# Patient Record
Sex: MALE | URBAN - METROPOLITAN AREA
[De-identification: names, ages, dates, MRNs, and addresses within clinical notes are randomized per-mention and may not be internally consistent; named-entity substitution may affect disease eponyms.]

---

## 2018-06-07 ENCOUNTER — OFFICE VISIT (OUTPATIENT)
Dept: SURGERY | Facility: CLINIC | Age: 28
End: 2018-06-07
Payer: COMMERCIAL

## 2018-06-07 VITALS
HEIGHT: 75 IN | HEART RATE: 60 BPM | WEIGHT: 194.31 LBS | DIASTOLIC BLOOD PRESSURE: 70 MMHG | SYSTOLIC BLOOD PRESSURE: 122 MMHG | TEMPERATURE: 98 F | BODY MASS INDEX: 24.16 KG/M2

## 2018-06-07 DIAGNOSIS — K21.9 GASTROESOPHAGEAL REFLUX DISEASE WITHOUT ESOPHAGITIS: ICD-10-CM

## 2018-06-07 PROCEDURE — 99203 OFFICE O/P NEW LOW 30 MIN: CPT | Mod: S$GLB,,, | Performed by: SURGERY

## 2018-06-07 PROCEDURE — 99999 PR PBB SHADOW E&M-NEW PATIENT-LVL III: CPT | Mod: PBBFAC,,, | Performed by: SURGERY

## 2018-06-07 RX ORDER — CITALOPRAM 20 MG/1
TABLET, FILM COATED ORAL
Refills: 0 | COMMUNITY
Start: 2018-05-18

## 2018-06-07 RX ORDER — DEXCHLORPHENIRAMINE MALEATE AND PSEUDOEPHEDRINE HYDROCHLORIDE 2; 60 MG/1; MG/1
TABLET, MULTILAYER ORAL
Refills: 0 | COMMUNITY
Start: 2018-03-16

## 2018-06-07 RX ORDER — AMITRIPTYLINE HYDROCHLORIDE 100 MG/1
TABLET ORAL
Refills: 11 | COMMUNITY
Start: 2018-04-14

## 2018-06-07 RX ORDER — OMEPRAZOLE 40 MG/1
40 CAPSULE, DELAYED RELEASE ORAL 2 TIMES DAILY
Refills: 5 | COMMUNITY
Start: 2018-04-15

## 2018-06-07 NOTE — Clinical Note
June 7, 2018      Hernandez Gonzalez MD  [address] [facility] [address]           [facility]  [address]  [place] [address]  Phone: [phone]          Patient: Rajesh Hermosillo   MR Number: [id number]   YOB: 1990   Date of Visit: 6/7/2018       Dear Dr. Hernandez Gonzalez:    Thank you for referring Rajesh Hermosillo to me for evaluation. Attached you will find relevant portions of my assessment and plan of care.    If you have questions, please do not hesitate to call me. I look forward to following Rajesh Hermosillo along with you.    Sincerely,    Qasim Moser MD    Enclosure  CC:  No Recipients    If you would like to receive this communication electronically, please contact [email or web address] or [phone] to request more information on [email or web address] Link access.    For providers and/or their staff who would like to refer a patient to [facility], please contact us through our one-stop-shop provider referral line, [facility] , at [phone].    If you feel you have received this communication in error or would no longer like to receive these types of communications, please e-mail [email or web address]

## 2018-06-07 NOTE — PROGRESS NOTES
History & Physical    SUBJECTIVE:     History of Present Illness:  Patient is a 27 y.o. male presents with gerd.  He has had trouble most of his life but symptoms worsened last year.  He has epigastric pain with typical heartburn, regurgitation, bloating, gas and nausea with eating.  Fried foods, red sauce and beer are the worst foods for him.  There are no alleviating factors.  He takes doubles up the doses of PPI with no improvement.  He saw me 10 years ago (about) and states that a ph probe at that time was positive.    No chief complaint on file.      Review of patient's allergies indicates:   Allergen Reactions    Penicillins Rash       Current Outpatient Prescriptions   Medication Sig Dispense Refill    citalopram (CELEXA) 20 MG tablet TK 1 T PO D  0    omeprazole (PRILOSEC) 40 MG capsule Take 40 mg by mouth 2 (two) times daily.  5    amitriptyline (ELAVIL) 100 MG tablet TAKE 1 TABLET BY MOUTH EVERY NIGHT AS DIRECTED  11    RESCON 2-60 mg Tab TK 1 PO Q 12 H  0     No current facility-administered medications for this visit.        Past Medical History:   Diagnosis Date    Anxiety     GERD (gastroesophageal reflux disease)      History reviewed. No pertinent surgical history.  History reviewed. No pertinent family history.  Social History   Substance Use Topics    Smoking status: Former Smoker    Smokeless tobacco: Former User    Alcohol use Not on file        Review of Systems:  Review of Systems   Constitutional: Negative for fever and unexpected weight change.   Respiratory: Negative for cough, chest tightness and shortness of breath.    Cardiovascular: Negative for chest pain.   Gastrointestinal: Negative for constipation and diarrhea.   Genitourinary: Negative for difficulty urinating and dysuria.   Skin: Negative for rash and wound.   Neurological: Negative for seizures and headaches.   Hematological: Does not bruise/bleed easily.       OBJECTIVE:     Vital Signs (Most Recent)  Temp: 98.4 °F  "(36.9 °C) (06/07/18 0839)  Pulse: 60 (06/07/18 0839)  BP: 122/70 (06/07/18 0839)  6' 3" (1.905 m)  88.2 kg (194 lb 5.4 oz)     Physical Exam:  Physical Exam   Constitutional: He is oriented to person, place, and time. He appears well-developed and well-nourished.   Neck: Normal range of motion. Neck supple.   Cardiovascular: Normal rate, regular rhythm and normal heart sounds.    Pulmonary/Chest: Effort normal and breath sounds normal.   Abdominal: Soft. Bowel sounds are normal. There is no tenderness.   Neurological: He is alert and oriented to person, place, and time.   Skin: Skin is warm and dry.   Psychiatric: He has a normal mood and affect. His behavior is normal. Judgment and thought content normal.   Vitals reviewed.      Laboratory  None available    Diagnostic Results:  US: Reviewed  EGD: Reviewed    ASSESSMENT/PLAN:     Small hiatal hernia with gerd symptoms.  He states his ph probe was positive but we don't have it in our current records.    PLAN:    Obtain motility and ges.  For lap hh repair and fundoplication.               "

## 2018-06-07 NOTE — LETTER
[facility] - [facility]  [address]  [facility] [address]  Phone: [phone] June 7, 2018      Hernandez Gonzalez MD  [address] [facility] [address]    Patient: Rajesh Hermosillo   MR Number: [id number]   YOB: 1990   Date of Visit: 6/7/2018     Dear Dr. Gonzalez:    Thank you for referring Rajesh Hermosillo to me for evaluation. Attached you will find relevant portions of my assessment and plan of care.    ASSESSMENT/PLAN:     Small hiatal hernia with GERD symptoms.  He states his ph probe was positive but we don't have it in our current records.    PLAN: Obtain motility and GES.  For lap HH repair and fundoplication.     If you have questions, please do not hesitate to call me. I look forward to following Rajesh Hermosillo along with you.    Sincerely,      Qasim Moser MD  Section Head - General, Laparoscopic, Bariatric  Acute Care and Oncologic Surgery   - Surgical Weight Loss Program  [facility]    WSR/trent    CC  Rajesh Gama MD    Enclosure

## 2018-06-11 ENCOUNTER — TELEPHONE (OUTPATIENT)
Dept: SURGERY | Facility: CLINIC | Age: 28
End: 2018-06-11

## 2018-06-11 NOTE — TELEPHONE ENCOUNTER
Ishaanalex wasn't available to accept my call personally,but I left a voice message with direct contact asking him to call the office to schedule manometry/motility and gastric emptying study.

## 2018-06-14 ENCOUNTER — TELEPHONE (OUTPATIENT)
Dept: SURGERY | Facility: CLINIC | Age: 28
End: 2018-06-14

## 2018-06-14 NOTE — TELEPHONE ENCOUNTER
Spoke to wife she has the motility study scheduled for 6/20/18 and the GES was scheduled for 6/26/18. Pt v/u

## 2018-06-14 NOTE — TELEPHONE ENCOUNTER
----- Message from Lissette Burks sent at 6/14/2018  9:35 AM CDT -----  Contact: pt surinder- Vinita  Pt asiagali called and stated that pt had missed a call from the nurse on Monday in regards to getting a gastric emptying study scheduled. Pt surinder stated that due to pt's job, he is unavailable to answer the phone most of the time, so it is more efficient to contact her when getting things scheduled. Pt surinder stated that she has attempted several times over the past couple of days to get in contact with the nurse, however she has not been able to reach her. Pt asiagali would like for the nurse to give either pt, or herself a call back to get this procedure scheduled.    If attempting to call pt, please call between 11:30-12:30. Pt can be reached at [phone]  If unable to reach pt, please contact Vinita at [phone]. Vinita can be reached at any time. Thank you.

## 2018-06-20 ENCOUNTER — SURGERY (OUTPATIENT)
Age: 28
End: 2018-06-20

## 2018-06-20 ENCOUNTER — HOSPITAL ENCOUNTER (OUTPATIENT)
Facility: HOSPITAL | Age: 28
Discharge: HOME | End: 2018-06-20
Attending: INTERNAL MEDICINE | Admitting: INTERNAL MEDICINE
Payer: COMMERCIAL

## 2018-06-20 VITALS
DIASTOLIC BLOOD PRESSURE: 76 MMHG | SYSTOLIC BLOOD PRESSURE: 134 MMHG | BODY MASS INDEX: 23.62 KG/M2 | OXYGEN SATURATION: 97 % | WEIGHT: 190 LBS | TEMPERATURE: 99 F | RESPIRATION RATE: 20 BRPM | HEART RATE: 71 BPM | HEIGHT: 75 IN

## 2018-06-20 DIAGNOSIS — K44.9 HIATAL HERNIA: ICD-10-CM

## 2018-06-20 PROCEDURE — 91037 ESOPH IMPED FUNCTION TEST: CPT | Performed by: INTERNAL MEDICINE

## 2018-06-20 PROCEDURE — 25000003 PHARM REV CODE 250: Performed by: INTERNAL MEDICINE

## 2018-06-20 PROCEDURE — 91010 ESOPHAGUS MOTILITY STUDY: CPT | Performed by: INTERNAL MEDICINE

## 2018-06-20 RX ORDER — CHLORDIAZEPOXIDE HYDROCHLORIDE AND CLIDINIUM BROMIDE 5; 2.5 MG/1; MG/1
1 CAPSULE ORAL 3 TIMES DAILY
COMMUNITY

## 2018-06-20 RX ORDER — LIDOCAINE HYDROCHLORIDE 20 MG/ML
JELLY TOPICAL ONCE AS NEEDED
Status: COMPLETED | OUTPATIENT
Start: 2018-06-20 | End: 2018-06-20

## 2018-06-20 RX ADMIN — LIDOCAINE HYDROCHLORIDE 10 ML: 20 JELLY TOPICAL at 10:06

## 2018-06-21 PROCEDURE — 91010 ESOPHAGUS MOTILITY STUDY: CPT | Mod: 26,,, | Performed by: INTERNAL MEDICINE

## 2018-06-21 PROCEDURE — 91037 ESOPH IMPED FUNCTION TEST: CPT | Mod: 26,,, | Performed by: INTERNAL MEDICINE

## 2018-06-23 NOTE — [employer] PATIENT INSTRUCTIONS
Discharge Summary/Instructions after an Endoscopic Procedure  Patient Name: Rajesh Hermosillo  Patient MRN: [id number]  Patient YOB: 1990 Thursday, June 21, 2018  Karson Gallardo MD  RESTRICTIONS:  During your procedure today, you received medications for sedation.  These   medications may affect your judgment, balance and coordination.  Therefore,   for 24 hours, you have the following restrictions:   - DO NOT drive a car, operate machinery, make legal/financial decisions,   sign important papers or drink alcohol.    ACTIVITY:  Today: no heavy lifting, straining or running due to procedural   sedation/anesthesia.  The following day: return to full activity including work.  DIET:  Eat and drink normally unless instructed otherwise.     TREATMENT FOR COMMON SIDE EFFECTS:  - Mild abdominal pain, nausea, belching, bloating or excessive gas:  rest,   eat lightly and use a heating pad.  - Sore Throat: treat with throat lozenges and/or gargle with warm salt   water.  - Because air was used during the procedure, expelling large amounts of air   from your rectum or belching is normal.  - If a bowel prep was taken, you may not have a bowel movement for 1-3 days.    This is normal.  SYMPTOMS TO WATCH FOR AND REPORT TO YOUR PHYSICIAN:  1. Abdominal pain or bloating, other than gas cramps.  2. Chest pain.  3. Back pain.  4. Signs of infection such as: chills or fever occurring within 24 hours   after the procedure.  5. Rectal bleeding, which would show as bright red, maroon, or black stools.   (A tablespoon of blood from the rectum is not serious, especially if   hemorrhoids are present.)  6. Vomiting.  7. Weakness or dizziness.  GO DIRECTLY TO THE NEAREST EMERGENCY ROOM IF YOU HAVE ANY OF THE FOLLOWING:      Difficulty breathing              Chills and/or fever over 101 F   Persistent vomiting and/or vomiting blood   Severe abdominal pain   Severe chest pain   Black, tarry stools   Bleeding- more than one  tablespoon   Any other symptom or condition that you feel may need urgent attention  Your doctor recommends these additional instructions:  If any biopsies were taken, your doctors clinic will contact you in 1 to 2   weeks with any results.  - Patient has a contact number available for emergencies.  The signs and   symptoms of potential delayed complications were discussed with the   patient.  Return to normal activities tomorrow.  Written discharge   instructions were provided to the patient.   - Resume previous diet.   - Follow an antireflux regimen.   - If proceeding with reflux surgery, consider partial wrap  For questions, problems or results please call your physician - Karson Gallardo MD at Work:  [phone].  [facility], EMERGENCY ROOM PHONE NUMBER: [phone]  IF A COMPLICATION OR EMERGENCY SITUATION ARISES AND YOU ARE UNABLE TO REACH   YOUR PHYSICIAN - GO DIRECTLY TO THE EMERGENCY ROOM.  Karson Gallardo MD  6/23/2018 1:24:15 PM  This report has been verified and signed electronically.  [employer]

## 2018-06-26 ENCOUNTER — HOSPITAL ENCOUNTER (OUTPATIENT)
Dept: RADIOLOGY | Facility: HOSPITAL | Age: 28
Discharge: HOME | End: 2018-06-26
Attending: SURGERY
Payer: COMMERCIAL

## 2018-06-26 DIAGNOSIS — K21.9 GASTROESOPHAGEAL REFLUX DISEASE WITHOUT ESOPHAGITIS: ICD-10-CM

## 2018-06-26 PROCEDURE — 78264 GASTRIC EMPTYING IMG STUDY: CPT | Mod: 26,,, | Performed by: RADIOLOGY

## 2018-06-26 PROCEDURE — A9541 TC99M SULFUR COLLOID: HCPCS

## 2018-06-26 PROCEDURE — 78264 GASTRIC EMPTYING IMG STUDY: CPT | Mod: TC

## 2018-07-11 ENCOUNTER — TELEPHONE (OUTPATIENT)
Dept: BARIATRICS | Facility: CLINIC | Age: 28
End: 2018-07-11

## 2018-07-11 NOTE — TELEPHONE ENCOUNTER
----- Message from Kiley Christopher sent at 7/11/2018  3:47 PM CDT -----  Contact: evelin/ wife to be  Caller needs someone in the office to call them back, needs some information, please call Pt. At [phone]

## 2018-08-13 ENCOUNTER — TELEPHONE (OUTPATIENT)
Dept: SURGERY | Facility: CLINIC | Age: 28
End: 2018-08-13

## 2018-08-13 NOTE — TELEPHONE ENCOUNTER
Spoke to patients wife and she is wanting to schedule her husbands ph probe test, I will have the nurse place an order and the endoscopy # was given so it can be scheduled by patient. Pt v/u

## 2018-08-13 NOTE — TELEPHONE ENCOUNTER
----- Message from Kiley Christopher sent at 8/13/2018 10:26 AM CDT -----  Contact: Vinita/ Wife  Is calling because she says that she needs to find out what type of testing that her husbands Need to take , He has completed the Barium swallow test and the Gastric Study, Please call the Pt at [phone]

## 2018-08-14 ENCOUNTER — TELEPHONE (OUTPATIENT)
Dept: ENDOSCOPY | Facility: HOSPITAL | Age: 28
End: 2018-08-14

## 2018-08-14 NOTE — TELEPHONE ENCOUNTER
I need to clarify what procedure pt needs to be scheduled for, spoke with wife. There is an order in for an egd/bravo off PPI's and wife stated it's suppose to be 24 hr impedance off PPI's. She said that pt does not want to be sedated and spoke with Dr Moser about this. I also need to know how many days PPI's need to be held prior, let me know.

## 2018-08-15 NOTE — TELEPHONE ENCOUNTER
Spoke with pt's wife again, when I told her that Mr Hermosillo would need to be off his PPI's for 14 days prior to 24 hr impedance she stated he can't be off of it for more than 24 hrs or he will start vomiting. Does he need to hold Prilosec? Also she stated he had this procedure when he was younger and is questioning if he even needs it done or not. She would like to speak to Dr Moser about this before scheduling. She can be reached at [phone], her name is Vinita.